# Patient Record
Sex: MALE | Race: WHITE | NOT HISPANIC OR LATINO | Employment: FULL TIME | URBAN - METROPOLITAN AREA
[De-identification: names, ages, dates, MRNs, and addresses within clinical notes are randomized per-mention and may not be internally consistent; named-entity substitution may affect disease eponyms.]

---

## 2020-03-15 ENCOUNTER — LAB REQUISITION (OUTPATIENT)
Dept: LAB | Facility: HOSPITAL | Age: 30
End: 2020-03-15

## 2020-03-15 ENCOUNTER — HOSPITAL ENCOUNTER (EMERGENCY)
Facility: HOSPITAL | Age: 30
Discharge: HOME | End: 2020-03-15
Admitting: EMERGENCY MEDICINE

## 2020-03-15 VITALS
HEART RATE: 56 BPM | SYSTOLIC BLOOD PRESSURE: 143 MMHG | OXYGEN SATURATION: 97 % | DIASTOLIC BLOOD PRESSURE: 76 MMHG | BODY MASS INDEX: 25.83 KG/M2 | HEIGHT: 65 IN | TEMPERATURE: 98.5 F | WEIGHT: 155 LBS | RESPIRATION RATE: 16 BRPM

## 2020-03-15 DIAGNOSIS — S60.419A ABRASION OF FINGER OF RIGHT HAND, INITIAL ENCOUNTER: Primary | ICD-10-CM

## 2020-03-15 DIAGNOSIS — Z04.2 ENCOUNTER FOR EXAMINATION AND OBSERVATION FOLLOWING WORK ACCIDENT: ICD-10-CM

## 2020-03-15 LAB
AMPHET+METHAMPHET UR QL: NEGATIVE
BARBITURATES UR QL SCN: NEGATIVE
BENZODIAZ UR QL SCN: NEGATIVE
CANNABINOIDS SERPL QL: NEGATIVE
COCAINE UR QL: NEGATIVE
METHADONE UR QL SCN: NEGATIVE
OPIATES UR QL: NEGATIVE
OXYCODONE UR QL SCN: NEGATIVE

## 2020-03-15 PROCEDURE — 82570 ASSAY OF URINE CREATININE: CPT

## 2020-03-15 PROCEDURE — 80307 DRUG TEST PRSMV CHEM ANLYZR: CPT

## 2020-03-15 PROCEDURE — 90715 TDAP VACCINE 7 YRS/> IM: CPT | Performed by: NURSE PRACTITIONER

## 2020-03-15 PROCEDURE — 25010000002 TDAP 5-2.5-18.5 LF-MCG/0.5 SUSPENSION: Performed by: NURSE PRACTITIONER

## 2020-03-15 PROCEDURE — 99282 EMERGENCY DEPT VISIT SF MDM: CPT

## 2020-03-15 PROCEDURE — 90471 IMMUNIZATION ADMIN: CPT | Performed by: NURSE PRACTITIONER

## 2020-03-15 RX ADMIN — TETANUS TOXOID, REDUCED DIPHTHERIA TOXOID AND ACELLULAR PERTUSSIS VACCINE, ADSORBED 0.5 ML: 5; 2.5; 8; 8; 2.5 SUSPENSION INTRAMUSCULAR at 17:58

## 2020-03-15 NOTE — ED PROVIDER NOTES
Subjective   Chief complaint: Tetanus update      Context: Patient is a 29-year-old  who comes in stating he scraped his hand on a [place] fence while chasing a subjects.  He states he needs his tetanus updated.  He states that superficial abrasion is not bleeding and does not really need it evaluated.    Duration:today    Timing: n/a    Severity:0    Associated symptoms: denies          PCP:  none          Review of Systems   Constitutional: Negative for fever.   Musculoskeletal: Negative for myalgias.   Skin: Positive for wound.   Allergic/Immunologic: Negative for immunocompromised state.   Neurological: Negative for weakness.       No past medical history on file.    No Known Allergies    No past surgical history on file.    No family history on file.    Social History     Socioeconomic History   • Marital status:      Spouse name: Not on file   • Number of children: Not on file   • Years of education: Not on file   • Highest education level: Not on file           Objective   Physical Exam     Vital signs and traige nurse note reviewed.  Constitutional:  Awake, alert; well developed and well nourished.  No acute distress, the patient is examined in hospital gown.  HEENT:  Normocephalic, atraumatic;  with intact EOM; oropharynx is pink and moist without edema or erythema.  Neck: Supple, full range of motion without pain;   Cardiovascular: Regular rate and rhythm,    Pulmonary: Respiratory effort regular, nonlabored;   Musculoskeletal: Superficial abrasion noted to the palmar aspect of the right hand that is not bleeding, no underlying swelling or ecchymosis.  Good strength with flexion extension distal neurovascular and sensorimotor intact  Neuro: Alert oriented x3, speech is clear and appropriate.      Procedures           ED Course      Labs Reviewed - No data to display  Medications   Tdap (BOOSTRIX) injection 0.5 mL (has no administration in time range)     No radiology results for the last  day                                       MDM  Number of Diagnoses or Management Options  Abrasion of finger of right hand, initial encounter:   Diagnosis management comments: Medical decision  Comorbidities:  has no past medical history on file.  Differentials:   not all inclusive of differentials considered fracture felt unlikely based on HPI and physical exam  Radiology interpretation: Agrees not warranted    i discussed findings with patient who voices understanding of discharge instructions, signs and symptoms requiring return to ED; discharged improved and in stable condition with follow up for re-evaluation.  We discussed signs and symptoms of tendon sheath infection and wound infection and went to see his family doctor or return.  Updated on Tdap      Final diagnoses:   Abrasion of finger of right hand, initial encounter            Macy Daly, APRN  03/15/20 [id number]